# Patient Record
Sex: MALE | Race: BLACK OR AFRICAN AMERICAN | Employment: FULL TIME | URBAN - METROPOLITAN AREA
[De-identification: names, ages, dates, MRNs, and addresses within clinical notes are randomized per-mention and may not be internally consistent; named-entity substitution may affect disease eponyms.]

---

## 2022-05-18 ENCOUNTER — APPOINTMENT (OUTPATIENT)
Dept: GENERAL RADIOLOGY | Age: 32
End: 2022-05-18
Attending: EMERGENCY MEDICINE

## 2022-05-18 ENCOUNTER — HOSPITAL ENCOUNTER (EMERGENCY)
Age: 32
Discharge: HOME | End: 2022-05-18
Attending: EMERGENCY MEDICINE

## 2022-05-18 VITALS
RESPIRATION RATE: 16 BRPM | SYSTOLIC BLOOD PRESSURE: 158 MMHG | TEMPERATURE: 97.8 F | DIASTOLIC BLOOD PRESSURE: 82 MMHG | OXYGEN SATURATION: 100 % | HEART RATE: 86 BPM

## 2022-05-18 DIAGNOSIS — R10.9 RIGHT FLANK PAIN: Primary | ICD-10-CM

## 2022-05-18 LAB
APPEARANCE UR: ABNORMAL
BACTERIA URNS QL MICRO: ABNORMAL /HPF
BILIRUB UR QL: NEGATIVE
COLOR UR: YELLOW
EPITH CASTS URNS QL MICRO: NEGATIVE /LPF (ref 0–5)
GLUCOSE UR STRIP.AUTO-MCNC: NEGATIVE MG/DL
HGB UR QL STRIP: NEGATIVE
KETONES UR QL STRIP.AUTO: NEGATIVE MG/DL
LEUKOCYTE ESTERASE UR QL STRIP.AUTO: NEGATIVE
MUCOUS THREADS URNS QL MICRO: ABNORMAL /LPF
NITRITE UR QL STRIP.AUTO: NEGATIVE
PH UR STRIP: 6 [PH] (ref 5–8)
PROT UR STRIP-MCNC: 30 MG/DL
RBC #/AREA URNS HPF: NEGATIVE /HPF (ref 0–5)
SP GR UR REFRACTOMETRY: 1.03 (ref 1–1.03)
UROBILINOGEN UR QL STRIP.AUTO: 1 EU/DL (ref 0.2–1)
WBC URNS QL MICRO: ABNORMAL /HPF (ref 0–5)

## 2022-05-18 PROCEDURE — 81001 URINALYSIS AUTO W/SCOPE: CPT

## 2022-05-18 PROCEDURE — 93005 ELECTROCARDIOGRAM TRACING: CPT

## 2022-05-18 PROCEDURE — 99285 EMERGENCY DEPT VISIT HI MDM: CPT

## 2022-05-18 PROCEDURE — 71046 X-RAY EXAM CHEST 2 VIEWS: CPT

## 2022-05-18 PROCEDURE — 87086 URINE CULTURE/COLONY COUNT: CPT

## 2022-05-18 RX ORDER — IBUPROFEN 800 MG/1
800 TABLET ORAL EVERY 8 HOURS
Qty: 15 TABLET | Refills: 0 | Status: SHIPPED | OUTPATIENT
Start: 2022-05-18 | End: 2022-05-23

## 2022-05-19 LAB
ATRIAL RATE: 105 BPM
CALCULATED P AXIS, ECG09: 52 DEGREES
CALCULATED R AXIS, ECG10: 16 DEGREES
CALCULATED T AXIS, ECG11: 11 DEGREES
DIAGNOSIS, 93000: NORMAL
P-R INTERVAL, ECG05: 178 MS
Q-T INTERVAL, ECG07: 328 MS
QRS DURATION, ECG06: 80 MS
QTC CALCULATION (BEZET), ECG08: 433 MS
VENTRICULAR RATE, ECG03: 105 BPM

## 2022-05-19 NOTE — ED TRIAGE NOTES
\" for a few months I have had nagging back pain in lower back/rib area, when I cough or laugh I feel a pulling pain\" Vss nad noted

## 2022-05-19 NOTE — ED PROVIDER NOTES
EMERGENCY DEPARTMENT HISTORY AND PHYSICAL EXAM    Date: 5/18/2022  Patient Name: Heaven Mo    History of Presenting Illness     Chief Complaint   Patient presents with    Back Pain         History Provided By: Patient  Additional History (Context): Heaven Mo is a 33 y.o. male with No significant past medical history who presents with 9 of right-sided flank pain intermittent worse with coughing. Due to these had this chest discomfort and he notices it more when he is primarily sitting up. Pain is not there presently. PCP: Fabio Gonzalez NP        Past History     Past Medical History:  No past medical history on file. Past Surgical History:  No past surgical history on file. Family History:  No family history on file. Social History:  Social History     Tobacco Use    Smoking status: Not on file    Smokeless tobacco: Not on file   Substance Use Topics    Alcohol use: Not on file    Drug use: Not on file       Allergies:  No Known Allergies      Review of Systems   Review of Systems   Constitutional: Negative for fever. Respiratory: Positive for cough. Cardiovascular: Positive for chest pain. Genitourinary: Positive for flank pain. All Other Systems Negative  Physical Exam     Vitals:    05/18/22 2025 05/18/22 2202   BP: (!) 158/82    Pulse: 86    Resp: 16    Temp: 97.8 °F (36.6 °C)    SpO2: 100% 100%     Physical Exam  Vitals and nursing note reviewed. Constitutional:       General: He is not in acute distress. Appearance: He is well-developed. He is not ill-appearing, toxic-appearing or diaphoretic. Comments: Strong odor of marijuana   HENT:      Head: Normocephalic and atraumatic. Neck:      Thyroid: No thyromegaly. Vascular: No carotid bruit. Trachea: No tracheal deviation. Cardiovascular:      Rate and Rhythm: Regular rhythm. Tachycardia present. Heart sounds: Normal heart sounds. No murmur heard. No friction rub.  No gallop. Pulmonary:      Effort: Pulmonary effort is normal. No respiratory distress. Breath sounds: Normal breath sounds. No stridor. No wheezing or rales. Chest:      Chest wall: No tenderness. Abdominal:      General: There is no distension. Palpations: Abdomen is soft. There is no mass. Tenderness: There is no abdominal tenderness. There is no right CVA tenderness, guarding or rebound. Musculoskeletal:         General: Normal range of motion. Cervical back: Normal range of motion and neck supple. Skin:     General: Skin is warm and dry. Coloration: Skin is not pale. Neurological:      Mental Status: He is alert. Psychiatric:         Speech: Speech normal.         Behavior: Behavior normal.         Thought Content:  Thought content normal.         Judgment: Judgment normal.                Diagnostic Study Results     Labs -     Recent Results (from the past 12 hour(s))   URINALYSIS W/ RFLX MICROSCOPIC    Collection Time: 05/18/22  9:45 PM   Result Value Ref Range    Color YELLOW      Appearance CLOUDY      Specific gravity 1.029 1.005 - 1.030      pH (UA) 6.0 5.0 - 8.0      Protein 30 (A) NEG mg/dL    Glucose Negative NEG mg/dL    Ketone Negative NEG mg/dL    Bilirubin Negative NEG      Blood Negative NEG      Urobilinogen 1.0 0.2 - 1.0 EU/dL    Nitrites Negative NEG      Leukocyte Esterase Negative NEG     URINE MICROSCOPIC ONLY    Collection Time: 05/18/22  9:45 PM   Result Value Ref Range    WBC 0 to 1 0 - 5 /hpf    RBC Negative 0 - 5 /hpf    Epithelial cells Negative 0 - 5 /lpf    Bacteria 1+ (A) NEG /hpf    Mucus 3+ (A) NEG /lpf   EKG, 12 LEAD, INITIAL    Collection Time: 05/18/22  9:50 PM   Result Value Ref Range    Ventricular Rate 105 BPM    Atrial Rate 105 BPM    P-R Interval 178 ms    QRS Duration 80 ms    Q-T Interval 328 ms    QTC Calculation (Bezet) 433 ms    Calculated P Axis 52 degrees    Calculated R Axis 16 degrees    Calculated T Axis 11 degrees    Diagnosis Sinus tachycardia  Cannot rule out Anterior infarct , age undetermined  Abnormal ECG  No previous ECGs available         Radiologic Studies -   XR CHEST PA LAT   Final Result      No acute findings in the chest.        CT Results  (Last 48 hours)    None        CXR Results  (Last 48 hours)               05/18/22 2149  XR CHEST PA LAT Final result    Impression:      No acute findings in the chest.       Narrative:  EXAM: PA AND LATERAL CHEST RADIOGRAPH       CLINICAL INDICATION/HISTORY: Chest pain       COMPARISON: None       TECHNIQUE: PA and lateral views of the chest       _______________       FINDINGS:       HEART AND MEDIASTINUM: Normal heart size and mediastinal contours. LUNGS AND PLEURAL SPACES: No suspicious pulmonary opacities. Pleural spaces   appear clear. BONY THORAX AND SOFT TISSUES: Unremarkable.       _______________                   Medical Decision Making   I am the first provider for this patient. I reviewed the vital signs, available nursing notes, past medical history, past surgical history, family history and social history. Vital Signs-Reviewed the patient's vital signs. Records Reviewed: Nursing Notes    Procedures:  EKG    Date/Time: 5/18/2022 9:50 PM  Performed by: JAH Azul  Authorized by: JAH Azul     ECG reviewed by ED Physician in the absence of a cardiologist: yes    Rate:     ECG rate:  105    ECG rate assessment: tachycardic    Rhythm:     Rhythm: sinus tachycardia    Ectopy:     Ectopy: none    QRS:     QRS axis:  Normal    QRS intervals:  Normal  Conduction:     Conduction: normal    ST segments:     ST segments:  Normal  T waves:     T waves: normal          Provider Notes (Medical Decision Making): Has intermittent discomfort so bodes against PE. Urine is not infected or with hematuria so doubt Aries or kidney stone. EKG unremarkable. Patient currently asymptomatic. No pneumonia or infiltrate on chest x-ray.   Discharge home with ibuprofen. MED RECONCILIATION:  No current facility-administered medications for this encounter. Current Outpatient Medications   Medication Sig    ibuprofen (MOTRIN) 800 mg tablet Take 1 Tablet by mouth every eight (8) hours for 5 days. Disposition:  home    DISCHARGE NOTE:   10:09 PM    Pt has been reexamined. Patient has no new complaints, changes, or physical findings. Care plan outlined and precautions discussed. Results of labs, CXR were reviewed with the patient. All medications were reviewed with the patient; will d/c home with ibuprofen. All of pt's questions and concerns were addressed. Patient was instructed and agrees to follow up with PCP, as well as to return to the ED upon further deterioration. Patient is ready to go home. Follow-up Information     Follow up With Specialties Details Why Contact Hi Adler, AGUEDA Nurse Practitioner Schedule an appointment as soon as possible for a visit in 2 days  [address] [place] [address] EMERGENCY DEPT Emergency Medicine  If symptoms worsen return immediately [address] Andrew Tilley  [phone]          Discharge Medication List as of 5/18/2022 10:08 PM              Diagnosis     Clinical Impression:   1.  Right flank pain

## 2022-05-20 LAB
BACTERIA SPEC CULT: NORMAL
SERVICE CMNT-IMP: NORMAL